# Patient Record
Sex: MALE | Race: BLACK OR AFRICAN AMERICAN | NOT HISPANIC OR LATINO | URBAN - METROPOLITAN AREA
[De-identification: names, ages, dates, MRNs, and addresses within clinical notes are randomized per-mention and may not be internally consistent; named-entity substitution may affect disease eponyms.]

---

## 2021-08-28 ENCOUNTER — TELEPHONE ENCOUNTER (OUTPATIENT)
Dept: URBAN - METROPOLITAN AREA CLINIC 13 | Facility: CLINIC | Age: 66
End: 2021-08-28

## 2021-08-29 ENCOUNTER — TELEPHONE ENCOUNTER (OUTPATIENT)
Dept: URBAN - METROPOLITAN AREA CLINIC 13 | Facility: CLINIC | Age: 66
End: 2021-08-29

## 2023-09-15 ENCOUNTER — OFFICE VISIT (OUTPATIENT)
Dept: URBAN - NONMETROPOLITAN AREA CLINIC 11 | Facility: CLINIC | Age: 68
End: 2023-09-15

## 2023-09-19 ENCOUNTER — LAB OUTSIDE AN ENCOUNTER (OUTPATIENT)
Dept: URBAN - METROPOLITAN AREA CLINIC 46 | Facility: CLINIC | Age: 68
End: 2023-09-19

## 2023-09-19 ENCOUNTER — DASHBOARD ENCOUNTERS (OUTPATIENT)
Age: 68
End: 2023-09-19

## 2023-09-19 ENCOUNTER — TELEPHONE ENCOUNTER (OUTPATIENT)
Dept: URBAN - METROPOLITAN AREA CLINIC 44 | Facility: CLINIC | Age: 68
End: 2023-09-19

## 2023-09-19 ENCOUNTER — OFFICE VISIT (OUTPATIENT)
Dept: URBAN - METROPOLITAN AREA CLINIC 46 | Facility: CLINIC | Age: 68
End: 2023-09-19
Payer: MEDICARE

## 2023-09-19 VITALS — WEIGHT: 156 LBS | BODY MASS INDEX: 21.84 KG/M2 | HEIGHT: 71 IN

## 2023-09-19 DIAGNOSIS — D50.0 ANEMIA, IRON DEFICIENCY FROM CHRONIC BLOOD LOSS: ICD-10-CM

## 2023-09-19 PROCEDURE — 99204 OFFICE O/P NEW MOD 45 MIN: CPT | Performed by: INTERNAL MEDICINE

## 2023-09-19 RX ORDER — CYCLOBENZAPRINE HYDROCHLORIDE 5 MG/1
1 TABLET AT BEDTIME AS NEEDED TABLET, FILM COATED ORAL ONCE A DAY
Qty: 14 TABLET | Status: ACTIVE | COMMUNITY

## 2023-09-19 RX ORDER — ALLOPURINOL 300 MG/1
1 TABLET TABLET ORAL ONCE A DAY
Qty: 90 TABLET | Status: ACTIVE | COMMUNITY

## 2023-09-19 RX ORDER — TRAMADOL HYDROCHLORIDE 50 MG/1
1 TABLET AS NEEDED TABLET, FILM COATED ORAL ONCE A DAY
Qty: 2 TABLET | Status: ACTIVE | COMMUNITY

## 2023-09-19 RX ORDER — CLONIDINE HYDROCHLORIDE 0.2 MG/1
1 TABLET TABLET ORAL ONCE A DAY
Qty: 90 TABLET | Status: ACTIVE | COMMUNITY

## 2023-09-19 RX ORDER — SIMVASTATIN 20 MG/1
1 TABLET IN THE EVENING TABLET, FILM COATED ORAL ONCE A DAY
Qty: 90 TABLET | Status: ACTIVE | COMMUNITY

## 2023-09-19 RX ORDER — HYDROCHLOROTHIAZIDE 25 MG/1
1 TABLET IN THE MORNING TABLET ORAL ONCE A DAY
Qty: 90 TABLET | Status: ACTIVE | COMMUNITY

## 2023-09-19 RX ORDER — SPIRONOLACTONE 25 MG/1
1 TABLET TABLET ORAL
Status: ACTIVE | COMMUNITY

## 2023-09-19 RX ORDER — AMLODIPINE BESYLATE 10 MG/1
1 TABLET TABLET ORAL ONCE A DAY
Qty: 90 TABLET | Status: ACTIVE | COMMUNITY

## 2023-09-19 RX ORDER — COLCHICINE 0.6 MG/1
1 TABLET TABLET ORAL TWICE A DAY
Qty: 4 TABLET | Status: ACTIVE | COMMUNITY

## 2023-09-19 RX ORDER — ASPIRIN 81 MG/1
1 TABLET TABLET, COATED ORAL ONCE A DAY
Status: ACTIVE | COMMUNITY

## 2023-09-19 RX ORDER — AMOXICILLIN 500 MG/1
1 CAPSULE CAPSULE ORAL
Qty: 30 CAPSULE | Status: ACTIVE | COMMUNITY

## 2023-09-19 NOTE — HPI-TODAY'S VISIT:
68-year-old male is here for evaluation of anemia.  His hemoglobin dropped to 11.8 hematocrit 37.3 platelets 217 white cell count 6.1.  His last colonoscopy was 2017 which showed diverticulosis and internal hemorrhoids.  His primary care doctor is concerned as the patient has had a 50 pound weight loss over the past couple years.  The patient attributes this to the death of his wife 2 years ago.  He does take a baby aspirin but denies a prior history of peptic ulcer disease or NSAID use.  Currently he feels good he has no abdominal pain constipation rectal bleeding or change in his bowel habits.  He believes that he is lost weight because he does not eat like he used to.

## 2023-10-27 ENCOUNTER — OFFICE VISIT (OUTPATIENT)
Dept: URBAN - NONMETROPOLITAN AREA CLINIC 11 | Facility: CLINIC | Age: 68
End: 2023-10-27

## 2024-02-15 ENCOUNTER — COL/EGD (OUTPATIENT)
Dept: URBAN - NONMETROPOLITAN AREA MEDICAL CENTER 6 | Facility: MEDICAL CENTER | Age: 69
End: 2024-02-15